# Patient Record
Sex: FEMALE | Race: WHITE | NOT HISPANIC OR LATINO | URBAN - METROPOLITAN AREA
[De-identification: names, ages, dates, MRNs, and addresses within clinical notes are randomized per-mention and may not be internally consistent; named-entity substitution may affect disease eponyms.]

---

## 2024-03-06 ENCOUNTER — OFFICE VISIT (OUTPATIENT)
Dept: URGENT CARE | Facility: CLINIC | Age: 39
End: 2024-03-06
Payer: COMMERCIAL

## 2024-03-06 ENCOUNTER — APPOINTMENT (OUTPATIENT)
Dept: RADIOLOGY | Facility: CLINIC | Age: 39
End: 2024-03-06
Payer: COMMERCIAL

## 2024-03-06 VITALS
RESPIRATION RATE: 18 BRPM | SYSTOLIC BLOOD PRESSURE: 130 MMHG | OXYGEN SATURATION: 96 % | WEIGHT: 190 LBS | HEIGHT: 64 IN | BODY MASS INDEX: 32.44 KG/M2 | DIASTOLIC BLOOD PRESSURE: 78 MMHG | HEART RATE: 98 BPM | TEMPERATURE: 97.7 F

## 2024-03-06 DIAGNOSIS — S92.355A NONDISPLACED FRACTURE OF FIFTH METATARSAL BONE, LEFT FOOT, INITIAL ENCOUNTER FOR CLOSED FRACTURE: Primary | ICD-10-CM

## 2024-03-06 DIAGNOSIS — M79.672 LEFT FOOT PAIN: ICD-10-CM

## 2024-03-06 PROCEDURE — 99203 OFFICE O/P NEW LOW 30 MIN: CPT

## 2024-03-06 PROCEDURE — 73630 X-RAY EXAM OF FOOT: CPT

## 2024-03-06 RX ORDER — MEDROXYPROGESTERONE ACETATE 150 MG/ML
150 INJECTION, SUSPENSION INTRAMUSCULAR
COMMUNITY
Start: 2023-11-21

## 2024-03-06 RX ORDER — CETIRIZINE HYDROCHLORIDE 10 MG/1
1 TABLET, CHEWABLE ORAL DAILY PRN
COMMUNITY

## 2024-03-06 RX ORDER — ALBUTEROL SULFATE 90 UG/1
2 AEROSOL, METERED RESPIRATORY (INHALATION) EVERY 6 HOURS PRN
COMMUNITY
Start: 2023-11-27

## 2024-03-06 RX ORDER — TRAZODONE HYDROCHLORIDE 50 MG/1
25 TABLET ORAL DAILY PRN
COMMUNITY
Start: 2024-02-29

## 2024-03-06 RX ORDER — KETOCONAZOLE 20 MG/ML
SHAMPOO TOPICAL
COMMUNITY
Start: 2023-12-27

## 2024-03-06 RX ORDER — BUPROPION HYDROCHLORIDE 300 MG/1
300 TABLET ORAL
COMMUNITY
Start: 2024-02-29

## 2024-03-06 RX ORDER — CHOLECALCIFEROL (VITAMIN D3) 25 MCG
CAPSULE ORAL DAILY
COMMUNITY

## 2024-03-06 RX ORDER — CLOBETASOL PROPIONATE 0.5 MG/G
OINTMENT TOPICAL
COMMUNITY
Start: 2024-02-05

## 2024-03-06 RX ORDER — ESCITALOPRAM OXALATE 20 MG/1
20 TABLET ORAL DAILY
COMMUNITY
Start: 2024-02-29

## 2024-03-06 NOTE — PROGRESS NOTES
[facility]        NAME: Soheila Simmons is a 38 y.o. female  : 1985    MRN: [id number]  DATE: 2024  TIME: 5:30 PM    Assessment and Plan   Nondisplaced fracture of fifth metatarsal bone, left foot, initial encounter for closed fracture [S92.355A]  1. Nondisplaced fracture of fifth metatarsal bone, left foot, initial encounter for closed fracture  XR foot 3+ vw left    Ambulatory Referral to Podiatry    Orthopedic injury treatment        Orthopedic injury treatment    Date/Time: 3/6/2024 5:30 PM    Performed by: RADHA Witt  Authorized by: Todd Jackson MD    Patient Location:  [facility] Protocol:  Procedure performed by:  Consent: Verbal consent obtained.  Risks and benefits: risks, benefits and alternatives were discussed  Consent given by: patient  Patient understanding: patient states understanding of the procedure being performed  Patient identity confirmed: verbally with patient    Injury location:  Foot  Location details:  Left foot  Injury type:  Fracture  Fracture type: fifth metatarsal    Neurovascular status: Neurovascularly intact    Distal perfusion: normal    Neurological function: normal    Range of motion: reduced    Immobilization:  Crutches (post-operative shoe)  Neurovascular status: Neurovascularly intact    Distal perfusion: normal    Neurological function: normal    Range of motion: unchanged    Patient tolerance:  Patient tolerated the procedure well with no immediate complications    Preliminary XR read concerning for fracture of fifth tarsal, final read pending.  Postoperative shoe and crutches. Encouraged continued supportive measures.  RICE therapy. Referral placed to Podiatry. Follow up with PCP in 3-5 days or proceed to emergency department for worsening symptoms.  Patient verbalized understanding of instructions given.       Patient Instructions     Patient Instructions   Preliminary XR read concerning for fracture, final read  pending  Post-operative shoe and crutches  Rest, Ice, Compression, and Elevation  OTC Tylenol/Ibuprofen for pain  Referral placed to Podiatry   Follow up with PCP in 3-5 days.  Proceed to  ER if symptoms worsen.    If tests have been performed at [facility], our office will contact you with results if changes need to be made to the care plan discussed with you at the visit.  You can review your full results on [facility] MyChart.    Foot Fracture in Adults   AMBULATORY CARE:   A foot fracture  is a break in a bone in your foot.       Common signs and symptoms:   Tenderness over the injured area    Foot pain that increases when you try to stand or walk    Numbness in your foot or toes    Cracking sounds when you move your foot    Swelling, bruising, blistering, or open skin breaks    Trouble moving your foot or walking    Foot shape that is not normal    Call your local emergency number (911 in the [place]) if:   You suddenly feel lightheaded and short of breath.    You have chest pain when you take a deep breath or cough.    You cough up blood.    Seek care immediately if:   The pain in your injured foot gets worse even after you rest and take pain medicine.    The skin or toes of your foot become numb, swollen, cold, white, or blue.    You have more pain or swelling than you did before a cast was put on.    Your leg feels warm, tender, and painful. It may look swollen and red.    Call your doctor if:   You have a fever.    You have new sores around your boot, cast, or splint.    You have new or worsening trouble moving your foot.    You notice a foul smell coming from under your cast.    Your boot, cast, or splint gets damaged.    You have questions or concerns about your condition or care.    Treatment  depends on the kind of fracture you have and how bad it is. You may need any of the following:  A boot, cast, or splint  may be put on your foot and lower leg to decrease your foot movement. These work to hold the broken  bones in place, decrease pain, and prevent more damage to your foot.    Medicines  may be given to prevent or treat pain or a bacterial infection. You may also need a vaccine to prevent tetanus if bone broke through the skin. A tetanus shot is given if you have not had a booster in the past 5 to 10 years.    Surgery  may be used to put your bones back into the correct position. Wires, pins, plates, or screws may be used to keep the broken pieces lined up correctly and hold them together.    Self-care:   Rest  your foot and avoid activities that cause pain.    Apply ice  to decrease swelling and pain, and to prevent tissue damage. Use an ice pack, or put crushed ice in a plastic bag. Cover it with a towel before you apply it. Use ice for 15 to 20 minutes every hour or as directed.    Elevate your foot  above the level of your heart as often as you can. This will help decrease swelling and pain. Prop your foot on pillows or blankets to keep it elevated comfortably.         Physical therapy  may be needed when your foot has healed. A physical therapist can teach you exercises to help improve movement and strength, and to decrease pain.    Cast or splint care:   Ask when it is okay to take a bath or shower. Do not let your cast or splint get wet. Before you bathe, cover the cast or splint with a plastic bag. Tape the bag to your skin above the splint to seal out water. Keep your foot out of the water in case the bag leaks.    Check the skin around your splint daily for any redness or open areas.    Do not use a sharp or pointed object to scratch your skin under the splint.    Do not remove your splint unless your healthcare provider or orthopedic surgeon says it is okay.    Assistive devices:  You may be given a hard-soled shoe to wear while your foot is healing. You also may need to use crutches to help you walk while your foot heals. It is important to use your crutches correctly. Ask for more information about how to  "use crutches.  Follow up with your doctor or bone specialist as directed:  You may need to return to have your splint or stitches removed. You may also need to return for tests to make sure your foot is healing. Write down your questions so you remember to ask them during your visits.  © Copyright [employer] 2023 Information is for End User's use only and may not be sold, redistributed or otherwise used for commercial purposes.  The above information is an  only. It is not intended as medical advice for individual conditions or treatments. Talk to your doctor, nurse or pharmacist before following any medical regimen to see if it is safe and effective for you.          Chief Complaint     Chief Complaint   Patient presents with    Ankle Pain     Rolled ankle today at 8am    Foot Pain         History of Present Illness       38-year-old female with a past medical history significant for hypertension presents with complaints of left foot pain x 1 day.  Patient reports ambulating up the stairs when she misjudged her footing and left ankle twisted, eversion injury.  Patient reports feeling and hearing a \"pop\" in left foot.  States some swelling and bruising.  Able to ambulate and bear weight but with difficulty.  She has been applying ice but did not take any OTC medications for her symptoms.        Review of Systems   Review of Systems   Constitutional:  Negative for chills and fever.   Respiratory:  Negative for cough.    Gastrointestinal:  Negative for abdominal pain, diarrhea, nausea and vomiting.   Musculoskeletal:  Positive for arthralgias, gait problem and joint swelling.   Skin:  Positive for color change. Negative for rash.   Neurological:  Negative for weakness and numbness.         Current Medications       Current Outpatient Medications:     buPROPion (WELLBUTRIN XL) 300 mg 24 hr tablet, Take 300 mg by mouth, Disp: , Rfl:     cetirizine (ZyrTEC) 10 MG chewable tablet, Chew 1 tablet daily as " "needed, Disp: , Rfl:     Cholecalciferol (Vitamin D-3) 25 MCG (1000 UT) CAPS, Take by mouth daily, Disp: , Rfl:     escitalopram (LEXAPRO) 20 mg tablet, Take 20 mg by mouth daily, Disp: , Rfl:     medroxyPROGESTERone (DEPO-PROVERA) 150 mg/mL injection, Inject 150 mg into a muscle every 3 (three) months, Disp: , Rfl:     traZODone (DESYREL) 50 mg tablet, Take 25 mg by mouth daily as needed, Disp: , Rfl:     albuterol (PROVENTIL HFA,VENTOLIN HFA) 90 mcg/act inhaler, Inhale 2 puffs every 6 (six) hours as needed (Patient not taking: Reported on 3/6/2024), Disp: , Rfl:     clobetasol (TEMOVATE) 0.05 % ointment, Apply daily x 2 weeks, then qod x 2 weeks, then 2 -3 times a week for maintenance (Patient not taking: Reported on 3/6/2024), Disp: , Rfl:     ketoconazole (NIZORAL) 2 % shampoo, SHAMPOO SCALP 2-3 TIMES PER WEEK. LEAVE IN FOR 5 MINUTES, THEN RINSE. ALTERNATE WITH OTHER SHAMPOO (Patient not taking: Reported on 3/6/2024), Disp: , Rfl:     Current Allergies     Allergies as of 03/06/2024 - Reviewed 03/06/2024   Allergen Reaction Noted    Penicillins Other (See Comments) 03/02/2015            The following portions of the patient's history were reviewed and updated as appropriate: allergies, current medications, past family history, past medical history, past social history, past surgical history and problem list.     Past Medical History:   Diagnosis Date    Anxiety     Asthma     Depression     Hypertension        History reviewed. No pertinent surgical history.    History reviewed. No pertinent family history.      Medications have been verified.        Objective   /78   Pulse 98   Temp 97.7 °F (36.5 °C)   Resp 18   Ht 5' 4\" (1.626 m)   Wt 86.2 kg (190 lb)   SpO2 96%   BMI 32.61 kg/m²   No LMP recorded.       Physical Exam     Physical Exam  Vitals and nursing note reviewed.   Constitutional:       General: She is not in acute distress.     Appearance: She is not toxic-appearing.   HENT:      Head: " Normocephalic.   Eyes:      Conjunctiva/sclera: Conjunctivae normal.   Pulmonary:      Effort: Pulmonary effort is normal.   Musculoskeletal:         General: Swelling and tenderness present.      Left lower leg: Normal.      Left ankle: Normal.      Left foot: Decreased range of motion. Swelling, tenderness and bony tenderness present.      Comments: TTP over dorsum of left foot near fifth metatarsal as well as lateral aspect with associated ecchymosis and swelling.    Skin:     General: Skin is warm and dry.   Neurological:      Mental Status: She is alert and oriented to person, place, and time.      Sensory: Sensation is intact.      Motor: Motor function is intact.      Comments: Seated in WC   Psychiatric:         Mood and Affect: Mood normal.         Behavior: Behavior normal.

## 2024-03-06 NOTE — PATIENT INSTRUCTIONS
Preliminary XR read concerning for fracture, final read pending  Post-operative shoe and crutches  Rest, Ice, Compression, and Elevation  OTC Tylenol/Ibuprofen for pain  Referral placed to Podiatry   Follow up with PCP in 3-5 days.  Proceed to  ER if symptoms worsen.    If tests have been performed at [facility], our office will contact you with results if changes need to be made to the care plan discussed with you at the visit.  You can review your full results on [facility] MyChart.    Foot Fracture in Adults   AMBULATORY CARE:   A foot fracture  is a break in a bone in your foot.       Common signs and symptoms:   Tenderness over the injured area    Foot pain that increases when you try to stand or walk    Numbness in your foot or toes    Cracking sounds when you move your foot    Swelling, bruising, blistering, or open skin breaks    Trouble moving your foot or walking    Foot shape that is not normal    Call your local emergency number (911 in the [place]) if:   You suddenly feel lightheaded and short of breath.    You have chest pain when you take a deep breath or cough.    You cough up blood.    Seek care immediately if:   The pain in your injured foot gets worse even after you rest and take pain medicine.    The skin or toes of your foot become numb, swollen, cold, white, or blue.    You have more pain or swelling than you did before a cast was put on.    Your leg feels warm, tender, and painful. It may look swollen and red.    Call your doctor if:   You have a fever.    You have new sores around your boot, cast, or splint.    You have new or worsening trouble moving your foot.    You notice a foul smell coming from under your cast.    Your boot, cast, or splint gets damaged.    You have questions or concerns about your condition or care.    Treatment  depends on the kind of fracture you have and how bad it is. You may need any of the following:  A boot, cast, or splint  may be put on your foot and lower leg to  decrease your foot movement. These work to hold the broken bones in place, decrease pain, and prevent more damage to your foot.    Medicines  may be given to prevent or treat pain or a bacterial infection. You may also need a vaccine to prevent tetanus if bone broke through the skin. A tetanus shot is given if you have not had a booster in the past 5 to 10 years.    Surgery  may be used to put your bones back into the correct position. Wires, pins, plates, or screws may be used to keep the broken pieces lined up correctly and hold them together.    Self-care:   Rest  your foot and avoid activities that cause pain.    Apply ice  to decrease swelling and pain, and to prevent tissue damage. Use an ice pack, or put crushed ice in a plastic bag. Cover it with a towel before you apply it. Use ice for 15 to 20 minutes every hour or as directed.    Elevate your foot  above the level of your heart as often as you can. This will help decrease swelling and pain. Prop your foot on pillows or blankets to keep it elevated comfortably.         Physical therapy  may be needed when your foot has healed. A physical therapist can teach you exercises to help improve movement and strength, and to decrease pain.    Cast or splint care:   Ask when it is okay to take a bath or shower. Do not let your cast or splint get wet. Before you bathe, cover the cast or splint with a plastic bag. Tape the bag to your skin above the splint to seal out water. Keep your foot out of the water in case the bag leaks.    Check the skin around your splint daily for any redness or open areas.    Do not use a sharp or pointed object to scratch your skin under the splint.    Do not remove your splint unless your healthcare provider or orthopedic surgeon says it is okay.    Assistive devices:  You may be given a hard-soled shoe to wear while your foot is healing. You also may need to use crutches to help you walk while your foot heals. It is important to use  your crutches correctly. Ask for more information about how to use crutches.  Follow up with your doctor or bone specialist as directed:  You may need to return to have your splint or stitches removed. You may also need to return for tests to make sure your foot is healing. Write down your questions so you remember to ask them during your visits.  © Copyright [employer] 2023 Information is for End User's use only and may not be sold, redistributed or otherwise used for commercial purposes.  The above information is an  only. It is not intended as medical advice for individual conditions or treatments. Talk to your doctor, nurse or pharmacist before following any medical regimen to see if it is safe and effective for you.

## 2024-03-07 ENCOUNTER — TELEPHONE (OUTPATIENT)
Age: 39
End: 2024-03-07

## 2024-03-07 NOTE — TELEPHONE ENCOUNTER
Caller: Soheila Simmons    Doctor/Office: /[facility]#: [phone]    Escalation: Appointment Patient was seen in [facility] with a metatarsal fracture. Requesting an appt in [place]. When should she be seen? Please call and advise/schedule. Thank you

## 2024-03-07 NOTE — TELEPHONE ENCOUNTER
Called pt to make her aware she should be non weight bearing. She is also aware that someone will call to schedule an appointment for next week in [place].

## 2024-03-08 ENCOUNTER — OFFICE VISIT (OUTPATIENT)
Dept: OBGYN CLINIC | Facility: CLINIC | Age: 39
End: 2024-03-08
Payer: COMMERCIAL

## 2024-03-08 VITALS
BODY MASS INDEX: 32.61 KG/M2 | HEIGHT: 64 IN | TEMPERATURE: 98.3 F | DIASTOLIC BLOOD PRESSURE: 80 MMHG | HEART RATE: 76 BPM | SYSTOLIC BLOOD PRESSURE: 130 MMHG

## 2024-03-08 DIAGNOSIS — S92.355A NONDISPLACED FRACTURE OF FIFTH METATARSAL BONE, LEFT FOOT, INITIAL ENCOUNTER FOR CLOSED FRACTURE: ICD-10-CM

## 2024-03-08 DIAGNOSIS — S99.922A FOOT INJURY, LEFT, INITIAL ENCOUNTER: ICD-10-CM

## 2024-03-08 DIAGNOSIS — S92.352A CLOSED FRACTURE OF BASE OF FIFTH METATARSAL BONE OF LEFT FOOT: Primary | ICD-10-CM

## 2024-03-08 DIAGNOSIS — M79.672 LEFT FOOT PAIN: ICD-10-CM

## 2024-03-08 PROCEDURE — 99204 OFFICE O/P NEW MOD 45 MIN: CPT | Performed by: STUDENT IN AN ORGANIZED HEALTH CARE EDUCATION/TRAINING PROGRAM

## 2024-03-08 PROCEDURE — 28470 CLTX METATARSAL FX WO MNP EA: CPT | Performed by: STUDENT IN AN ORGANIZED HEALTH CARE EDUCATION/TRAINING PROGRAM

## 2024-03-08 NOTE — PROGRESS NOTES
1. Closed fracture of base of fifth metatarsal bone of left foot  Cam Boot    Suspected Marvin's fracture      2. Left foot pain  Cam Boot      3. Foot injury, left, initial encounter          Orders Placed This Encounter   Procedures    Fracture / Dislocation Treatment    Cam Boot        Imaging Studies (I personally reviewed images in PACS and report):    X-ray left foot 3/6/2024: Acute nondisplaced fifth metatarsal base fracture.  Plantar and retrocalcaneal enthesophyte's.  Bipartite medial sesamoid.    IMPRESSION:  Acute left lateral foot pain/injury secondary to inversion injury  Radiographic imaging and clinical exam consistent with base of the fifth metatarsal fracture around the Monge fracture region  Date of Injury: 3/6/2024  Follow up interval:    Other factors:  BMI 32    PLAN:    Clinical exam and radiographic imaging reviewed with patient today, with impression as per above. I have discussed with the patient the pathophysiology of this diagnosis and reviewed how the examination correlates with this diagnosis.    Prior imaging reviewed as noted above.  Recommend we can attempt conservative treatment for now but did discuss the risk of Monge fracture and potential nonunion which would require surgical intervention.  For now, we will treat conservatively and transition her to a high tide Cam boot to minimize peroneal muscle from aggravating the base of her fifth metatarsal.  Recommended nonweightbearing on her left lower extremity with use of crutches.  Counseled she could also use a knee crutch or a knee scooter to help facilitate nonweightbearing.  Plan nonweightbearing status for the next 3 weeks.  Counseled base of the fifth metatarsal fracture can range in healing from 6 weeks to 3 months potentially.    In regards to pain control I counseled use of acetaminophen, NSAIDs, heat/ice therapy 20 minutes on/off, elevation of the affected extremity.  Recommended she continue use of her vitamin D  "supplementation of 6988-3885 international units daily along with increased calcium in her diet.    Declined need for work note today as she states she works from home.    Return in about 3 weeks (around 3/29/2024).    Portions of the record may have been created with voice recognition software. Occasional wrong word or \"sound a like\" substitutions may have occurred due to the inherent limitations of voice recognition software. Read the chart carefully and recognize, using context, where substitutions have occurred.     CHIEF COMPLAINT:  Chief Complaint   Patient presents with    Left Foot - Fracture         HPI:  Soheila Simmons is a 38 y.o. female  who presents for       Visit 3/8/2024:  Initial evaluation of left foot injury:  Precipitating injury on 3/6/2024  Reportedly while ascending stairs she had a misstep causing her to invert/twisted her foot with a popping sensation.  She states she was able to partially weight-bear and limp but due to progressive swelling and bruising over lateral foot she eventually went to urgent care on 3/6/2024 and had imaging done as noted above.  She was placed in a postop shoe and crutches.  She has been facilitating nonweightbearing with use of crutches.    She reports today that pain has not worsened but is mainly localized over the lateral aspect of her foot.  Denies any pain of her ankle.  Reports swelling and receding bruising.  Over the lateral aspect of her foot.  Denies any numbness/tingling of her left lower extremity.  In regards to pain control she has been applying ice and taking Tylenol and NSAIDs with relief.  She states she did purchase a knee crutch which should be arriving today to help facilitate nonweightbearing.  She denies similar injuries of her foot in the past and denies prior surgeries of her left foot.        Medical, Surgical, Family, and Social History    Past Medical History:   Diagnosis Date    Anxiety     Asthma     Depression     Hypertension  " "    History reviewed. No pertinent surgical history.  Social History   Social History     Substance and Sexual Activity   Alcohol Use Not Currently     Social History     Substance and Sexual Activity   Drug Use Never     Social History     Tobacco Use   Smoking Status Never   Smokeless Tobacco Never     History reviewed. No pertinent family history.  Allergies   Allergen Reactions    Penicillins Other (See Comments)     Other reaction(s): Child - unknown   Tolerates cephalosporins.          Physical Exam  /80   Pulse 76   Temp 98.3 °F (36.8 °C) (Temporal)   Ht 5' 4\" (1.626 m)   BMI 32.61 kg/m²     Constitutional:  see vital signs  Gen: well-developed, normocephalic/atraumatic, well-groomed  Eyes: No inflammation or discharge of conjunctiva or lids; sclera clear   Pharynx: no inflammation, lesion, or mass of lips  Pulmonary/Chest: Effort normal. No respiratory distress.     Ortho Exam   Ankle Examination (focused):     Gait: Patient is using a postop shoe and crutches.      LEFT   Inspection Erythema none    Edema 1+ lateral foot    Ecchymosis None        ROM:  Plantarflexion 50    Dorsiflexion 20        Strength Pronation Deferred due to pain    Supination 5/5    Foot plantarflexion 5/5    Foot dorsflexion 5/5        TTP AiTFL no    ATFL no    CFL no    PTFL no    Achilles no    Deltoid no    Peroneal no    Tib Ant no    Tib Post no        TTP (Bony) Prox Fibula no    Lat Malleolus no    Base of 5th MT +    Med Malleolus no    Navicular no    Talar Dome no        Anterior Drawer ATFL negative   Calcaneal Squeeze  negative   Tib-Fib Squeeze Test  negative   Talar Tilt (stab tib,DF foot,invert foot) CFL negative   Eversion stress (stab tib, suzette foot) deltoid negative   MT Compression  positive         No calf tenderness to palpation     LE NV Exam: +2 DP/PT pulses   Sensation intact to light touch   Flexion/extension of toes intact          Fracture / Dislocation Treatment    Date/Time: 3/8/2024 11:00 " AM    Performed by: Bronson Farmer MD  Authorized by: Bronson Farmer MD    Patient Location:  Clinic  [place] Protocol:  Consent: Verbal consent obtained.  Risks and benefits: risks, benefits and alternatives were discussed  Consent given by: patient  Patient understanding: patient states understanding of the procedure being performed  Radiology Images displayed and confirmed. If images not available, report reviewed: imaging studies available  Required items: required blood products, implants, devices, and special equipment available    Injury location:  Foot  Location details:  Left foot  Injury type:  Fracture  Fracture type: fifth metatarsal    Distal perfusion: normal    Neurological function: normal    Range of motion: reduced    Manipulation performed?: No    Immobilization:  Other (comment) and crutches (Patient already has crutches to facilitate nonweightbearing.  I have transitioned her from a postop shoe to a high tide Cam boot)  Patient tolerance:  Patient tolerated the procedure well with no immediate complications

## 2024-04-02 ENCOUNTER — HOSPITAL ENCOUNTER (OUTPATIENT)
Dept: RADIOLOGY | Facility: CLINIC | Age: 39
Discharge: HOME | End: 2024-04-02
Payer: COMMERCIAL

## 2024-04-02 ENCOUNTER — OFFICE VISIT (OUTPATIENT)
Dept: OBGYN CLINIC | Facility: CLINIC | Age: 39
End: 2024-04-02
Payer: COMMERCIAL

## 2024-04-02 VITALS
HEART RATE: 96 BPM | DIASTOLIC BLOOD PRESSURE: 80 MMHG | BODY MASS INDEX: 32.44 KG/M2 | HEIGHT: 64 IN | SYSTOLIC BLOOD PRESSURE: 132 MMHG | TEMPERATURE: 97.8 F | WEIGHT: 190 LBS

## 2024-04-02 DIAGNOSIS — S99.922D FOOT INJURY, LEFT, SUBSEQUENT ENCOUNTER: ICD-10-CM

## 2024-04-02 DIAGNOSIS — S92.352A CLOSED FRACTURE OF BASE OF FIFTH METATARSAL BONE OF LEFT FOOT: ICD-10-CM

## 2024-04-02 DIAGNOSIS — S92.352A CLOSED FRACTURE OF BASE OF FIFTH METATARSAL BONE OF LEFT FOOT: Primary | ICD-10-CM

## 2024-04-02 PROCEDURE — 99213 OFFICE O/P EST LOW 20 MIN: CPT | Performed by: STUDENT IN AN ORGANIZED HEALTH CARE EDUCATION/TRAINING PROGRAM

## 2024-04-02 PROCEDURE — 73630 X-RAY EXAM OF FOOT: CPT

## 2024-04-02 PROCEDURE — 99024 POSTOP FOLLOW-UP VISIT: CPT | Performed by: STUDENT IN AN ORGANIZED HEALTH CARE EDUCATION/TRAINING PROGRAM

## 2024-04-02 NOTE — PROGRESS NOTES
"1. Closed fracture of base of fifth metatarsal bone of left foot  XR foot 3+ vw left      2. Foot injury, left, subsequent encounter          Orders Placed This Encounter   Procedures    XR foot 3+ vw left        Imaging Studies (I personally reviewed images in PACS and report):    X-ray left foot 3/6/2024: Acute nondisplaced fifth metatarsal base fracture.  Plantar and retrocalcaneal enthesophyte's.  Bipartite medial sesamoid.    IMPRESSION:  Acute left lateral foot pain/injury secondary to inversion injury  Radiographic imaging and clinical exam consistent with base of the fifth metatarsal fracture around the Monge fracture region  Radiographic improvement of fracture today  Date of Injury: 3/6/2024  Follow up interval: 3 weeks, 6 days    Other factors:  BMI 32    PLAN:  Repeat x-ray next visit: Left foot  Clinical exam and radiographic imaging reviewed with patient today, with impression as per above. I have discussed with the patient the pathophysiology of this diagnosis and reviewed how the examination correlates with this diagnosis.    Repeated imaging of her left foot today as noted above.  I will follow-up official radiology interpretation.  Counseled patient can transition to weightbearing as tolerated in the cam boot alone or can be transitioned to a postop shoe.  I did offer a postop shoe today but patient states she already owns this medical device in her home.  Recommended working on home ankle range of motion exercises which were demonstrated in clinic today to prevent stiffness of her ankle.  In regards to pain control counseled as needed use of acetaminophen, NSAIDs, heat/ice therapy.  Recommended continued vitamin D supplementation of 4153-3056 units international units daily.  Declined need for work note today.    Return in about 17 days (around 4/19/2024).    Portions of the record may have been created with voice recognition software. Occasional wrong word or \"sound a like\" substitutions may have " "occurred due to the inherent limitations of voice recognition software. Read the chart carefully and recognize, using context, where substitutions have occurred.     CHIEF COMPLAINT:  Chief Complaint   Patient presents with    Follow-up         HPI:  Soheila Simmons is a 38 y.o. female  who presents with family members for     Visit 4/2/2024:  Follow-up evaluation of left fifth proximal metatarsal fracture:  Patient admits that she has not been fully adherent to nonweightbearing on her left lower extremity and has applied intermittent weight with a cam boot along with crutches.  She states there is still some intermittent mild aching pains along the lateral aspect of her foot but otherwise the swelling and bruising have improved/resolved.  Denies any N/T of left lower extremity.  Denies any new injuries of left foot since last visit.  Reports only sparing use of Tylenol/NSAIDs.        Medical, Surgical, Family, and Social History    Past Medical History:   Diagnosis Date    Anxiety     Asthma     Depression     Hypertension      History reviewed. No pertinent surgical history.  Social History   Social History     Substance and Sexual Activity   Alcohol Use Not Currently     Social History     Substance and Sexual Activity   Drug Use Never     Social History     Tobacco Use   Smoking Status Never   Smokeless Tobacco Never     Family History   Problem Relation Age of Onset    Vascular Disease Maternal Grandfather     Diabetes Maternal Grandmother     Cancer Paternal Grandfather     Osteoporosis Paternal Grandmother     Vascular Disease Maternal Aunt      Allergies   Allergen Reactions    Penicillins Other (See Comments)     Other reaction(s): Child - unknown   Tolerates cephalosporins.          Physical Exam  /80   Pulse 96   Temp 97.8 °F (36.6 °C) (Temporal)   Ht 5' 4\" (1.626 m)   Wt 86.2 kg (190 lb)   BMI 32.61 kg/m²     Constitutional:  see vital signs  Gen: BMI 32 normocephalic/atraumatic, " well-groomed  Eyes: No inflammation or discharge of conjunctiva or lids; sclera clear   Pharynx: no inflammation, lesion, or mass of lips  Pulmonary/Chest: Effort normal. No respiratory distress.     Ortho Exam   Ankle Examination (focused):     Gait: Patient is weightbearing in high tide Cam boot and crutches.      LEFT   Inspection Erythema none    Edema none    Ecchymosis None        ROM:  Plantarflexion 50    Dorsiflexion 20        Strength Pronation 4/5, aggravates lateral foot pain    Supination 5/5    Foot plantarflexion 5/5    Foot dorsflexion 5/5        TTP AiTFL no    ATFL no    CFL no    PTFL no    Achilles no    Deltoid no    Peroneal no    Tib Ant no    Tib Post no        TTP (Bony) Prox Fibula no    Lat Malleolus no    Base of 5th MT +mild    Med Malleolus no    Navicular no    Talar Dome no        Anterior Drawer ATFL negative   Calcaneal Squeeze  negative   Tib-Fib Squeeze Test  negative   Talar Tilt (stab tib,DF foot,invert foot) CFL negative   Eversion stress (stab tib, suzette foot) deltoid negative   MT Compression  positive         No calf tenderness to palpation     LE NV Exam: +2 DP/PT pulses   Sensation intact to light touch   Flexion/extension of toes intact          Procedures

## 2024-04-19 ENCOUNTER — HOSPITAL ENCOUNTER (OUTPATIENT)
Dept: RADIOLOGY | Facility: CLINIC | Age: 39
Discharge: HOME | End: 2024-04-19
Payer: COMMERCIAL

## 2024-04-19 ENCOUNTER — OFFICE VISIT (OUTPATIENT)
Dept: OBGYN CLINIC | Facility: CLINIC | Age: 39
End: 2024-04-19
Payer: COMMERCIAL

## 2024-04-19 VITALS
SYSTOLIC BLOOD PRESSURE: 130 MMHG | HEART RATE: 85 BPM | DIASTOLIC BLOOD PRESSURE: 78 MMHG | BODY MASS INDEX: 32.44 KG/M2 | WEIGHT: 190 LBS | HEIGHT: 64 IN | TEMPERATURE: 98.4 F

## 2024-04-19 DIAGNOSIS — S92.352A CLOSED FRACTURE OF BASE OF FIFTH METATARSAL BONE OF LEFT FOOT: ICD-10-CM

## 2024-04-19 DIAGNOSIS — S92.352A CLOSED FRACTURE OF BASE OF FIFTH METATARSAL BONE OF LEFT FOOT: Primary | ICD-10-CM

## 2024-04-19 PROCEDURE — 99024 POSTOP FOLLOW-UP VISIT: CPT | Performed by: STUDENT IN AN ORGANIZED HEALTH CARE EDUCATION/TRAINING PROGRAM

## 2024-04-19 PROCEDURE — 73630 X-RAY EXAM OF FOOT: CPT

## 2024-04-19 PROCEDURE — 99213 OFFICE O/P EST LOW 20 MIN: CPT | Performed by: STUDENT IN AN ORGANIZED HEALTH CARE EDUCATION/TRAINING PROGRAM

## 2024-04-19 NOTE — PATIENT INSTRUCTIONS
Foot Pain Home Therapy    Stretch. Place painful foot in back with heel on ground and lean against wall. Do not lift heel off of ground. You will feel the stretch in the calf muscles and possibly the heel. Hold the stretch for 20-30 seconds. Do this at least 5-6 times per day. It is best done after exercise when your muscles are warm.  Local ice massage. Freeze a 20oz bottle of water. Roll your foot over the ice bottle along the arch. Try this for 20 minutes, 2-3 times per day.  Wear supportive shoes at all times. Avoid flip-flops, flat sandals, barefoot walking (never walk barefoot, even at home). Generally avoid shoes that are too flexible and bend in the arch. Your shoes should only slightly bend in the toe area, not the middle. Running sneakers are often the best choice.  Supportive sneaker brands: Tobias, On Cloud, Hoka, Altra, New Balance, Asics, Mizuno  [employer] (discount if mention Dr mini)  [facility]et Feet [facility] Style[employer]rek [place]  Supportive daily shoe brands: Vionic, Orthofeet, Rose, Dansko, Chacos, [place], Teva, Birkenstock  Supportive home shoes: Tony Enriquez (recovery slides)  Purchase over the counter topical pain creams such as Voltarin gel, biofreeze, or CBD cream - will need to apply 2-3 times per day for benefit. + deep tissue massage.   Look in to over the counter shoe inserts/arch supports such as Superfeet, Powerstep or FootChair or Dananberg arch supports. These are all available on [employer].com as well as their individual website's.   [employer]ater[employer]: Vasyli+Dananberg 1st Ray Orthotic, Medium, 1st Ray Function, Medium Density, Full-Length Insole, Heat Molding Optional, Best All Around Orthotic, Functional Biomechanical Control for Pain Relief, Black Yellow (11573) : Health & Household

## 2024-04-23 NOTE — PROGRESS NOTES
"1. Closed fracture of base of fifth metatarsal bone of left foot  XR foot 3+ vw left        Orders Placed This Encounter   Procedures    XR foot 3+ vw left        Imaging Studies (I personally reviewed images in PACS and report):    X-ray left foot 4/19/2024: Redemonstrates a subacute nondisplaced fracture of the base of the fifth metatarsal with obscuration fracture line compared to prior imaging.  Small Achilles and plantar calcaneal enthesophytes.  Nontender over fifth metatarsal exam today.  X-ray left foot 3/6/2024: Acute nondisplaced fifth metatarsal base fracture.  Plantar and retrocalcaneal enthesophyte's.  Bipartite medial sesamoid.    IMPRESSION:  Acute left lateral foot pain/injury secondary to inversion injury  Radiographic imaging and clinical exam consistent with base of the fifth metatarsal fracture around the Monge fracture region  Radiographic improvement of fracture today; clinical improvement on exam  Date of Injury: 3/6/2024  Follow up interval: 6 weeks, 2 days    Other factors:  BMI 32    PLAN:    Clinical exam and radiographic imaging reviewed with patient today, with impression as per above. I have discussed with the patient the pathophysiology of this diagnosis and reviewed how the examination correlates with this diagnosis.    Repeated imaging of her left foot today as noted above.  I will follow-up official radiology interpretation.  Counseled patient can transition from cam boot/postop shoe to regular footwear as tolerated.  I counseled her she has difficulty fully transitioning because she could consider use of an insert in regular footwear but otherwise is allowed to start weightbearing as tolerated on her left lower extremity.  Recommended footwear types and foot pain home therapy discussed as per patient communications.    Return if symptoms worsen or fail to improve.    Portions of the record may have been created with voice recognition software. Occasional wrong word or \"sound a " "like\" substitutions may have occurred due to the inherent limitations of voice recognition software. Read the chart carefully and recognize, using context, where substitutions have occurred.     CHIEF COMPLAINT:  Chief Complaint   Patient presents with    Follow-up         HPI:  Soheila Simmons is a 38 y.o. female  who presents with family members for     Visit 4/19/2024:  Follow-up evaluation of left fifth proximal metatarsal shaft fracture:  Patient has remained adherent to using cam boot/postop shoe since last visit.  She states she has been doing well with minimal to no pain of her lateral foot.  She states she may have intermittent aching at times but for the most part she has been pain-free.  Denies foot swelling, discoloration, numbness/tingling.  She has not been taking any pain medications for this issue.  Denies any new injuries of her left lateral foot since last visit.    Patient is wondering if that she there are any types of footwear that she should consider using going forward.    Visit 4/2/2024:  Follow-up evaluation of left fifth proximal metatarsal fracture:  Patient admits that she has not been fully adherent to nonweightbearing on her left lower extremity and has applied intermittent weight with a cam boot along with crutches.  She states there is still some intermittent mild aching pains along the lateral aspect of her foot but otherwise the swelling and bruising have improved/resolved.  Denies any N/T of left lower extremity.  Denies any new injuries of left foot since last visit.  Reports only sparing use of Tylenol/NSAIDs.        Medical, Surgical, Family, and Social History    Past Medical History:   Diagnosis Date    Anxiety     Asthma     Depression     Hypertension      History reviewed. No pertinent surgical history.  Social History   Social History     Substance and Sexual Activity   Alcohol Use Not Currently     Social History     Substance and Sexual Activity   Drug Use Never " "    Social History     Tobacco Use   Smoking Status Never   Smokeless Tobacco Never     Family History   Problem Relation Age of Onset    Vascular Disease Maternal Grandfather     Diabetes Maternal Grandmother     Cancer Paternal Grandfather     Osteoporosis Paternal Grandmother     Vascular Disease Maternal Aunt      Allergies   Allergen Reactions    Penicillins Other (See Comments)     Other reaction(s): Child - unknown   Tolerates cephalosporins.          Physical Exam  /78   Pulse 85   Temp 98.4 °F (36.9 °C) (Temporal)   Ht 5' 4\" (1.626 m)   Wt 86.2 kg (190 lb)   BMI 32.61 kg/m²     Constitutional:  see vital signs  Gen: BMI 32 normocephalic/atraumatic, well-groomed  Eyes: No inflammation or discharge of conjunctiva or lids; sclera clear   Pharynx: no inflammation, lesion, or mass of lips  Pulmonary/Chest: Effort normal. No respiratory distress.     Ortho Exam   Ankle Examination (focused):     Gait: Patient is weightbearing in postop shoe.  Patient is able to demonstrate in office of weightbearing outside of the postop shoe on her barefoot and she denies any exacerbation of left foot pain.      LEFT   Inspection Erythema none    Edema none    Ecchymosis None        ROM:  Plantarflexion 50    Dorsiflexion 20        Strength Pronation 5/5    Supination 5/5    Foot plantarflexion 5/5    Foot dorsflexion 5/5        TTP AiTFL no    ATFL no    CFL no    PTFL no    Achilles no    Deltoid no    Peroneal no    Tib Ant no    Tib Post no        TTP (Bony) Prox Fibula no    Lat Malleolus no    Base of 5th MT No    Med Malleolus no    Navicular no    Talar Dome no        Anterior Drawer ATFL negative   Calcaneal Squeeze  negative   Tib-Fib Squeeze Test  negative   Talar Tilt (stab tib,DF foot,invert foot) CFL negative   Eversion stress (stab tib, suzette foot) deltoid negative   MT Compression  Negative         No calf tenderness to palpation     LE NV Exam: +2 DP/PT pulses   Sensation intact to light touch "   Flexion/extension of toes intact          Procedures